# Patient Record
(demographics unavailable — no encounter records)

---

## 2025-01-29 NOTE — CONSULT LETTER
[Dear  ___] : Dear ~ISIS, [Consult Letter:] : I had the pleasure of evaluating your patient, [unfilled]. [Please see my note below.] : Please see my note below. [Consult Closing:] : Thank you very much for allowing me to participate in the care of this patient.  If you have any questions, please do not hesitate to contact me. [Sincerely,] : Sincerely, [FreeTextEntry2] : Dr. Marsha Fontana [FreeTextEntry3] : Devin Singletary M.D., FANILA. Associate Professor of Surgery [place] [employer] [facility] [employer] at [facility]

## 2025-01-29 NOTE — PHYSICAL EXAM
[Normal] : supple, no neck mass and thyroid not enlarged [Normal Neck Lymph Nodes] : normal neck lymph nodes  [Normal Supraclavicular Lymph Nodes] : normal supraclavicular lymph nodes [Normal Axillary Lymph Nodes] : normal axillary lymph nodes [Normal] : oriented to person, place and time, with appropriate affect [FreeTextEntry1] : SC present for exam  [de-identified] : Normal S1,S2. Regular rate and rhythm [de-identified] : Complete breast exam performed in supine and upright position. No palpable masses, tenderness, nipple discharge, inversion, deviation or enlarged axillary or supraclavicular lymph nodes bilaterally. [de-identified] : Clear breath sounds bilaterally with normal respiratory effort.

## 2025-01-29 NOTE — PHYSICAL EXAM
[Normal] : supple, no neck mass and thyroid not enlarged [Normal Neck Lymph Nodes] : normal neck lymph nodes  [Normal Supraclavicular Lymph Nodes] : normal supraclavicular lymph nodes [Normal Axillary Lymph Nodes] : normal axillary lymph nodes [Normal] : oriented to person, place and time, with appropriate affect [FreeTextEntry1] : SC present for exam  [de-identified] : Normal S1,S2. Regular rate and rhythm [de-identified] : Complete breast exam performed in supine and upright position. No palpable masses, tenderness, nipple discharge, inversion, deviation or enlarged axillary or supraclavicular lymph nodes bilaterally. [de-identified] : Clear breath sounds bilaterally with normal respiratory effort.

## 2025-01-29 NOTE — CONSULT LETTER
[Dear  ___] : Dear ~ISIS, [Consult Letter:] : I had the pleasure of evaluating your patient, [unfilled]. [Please see my note below.] : Please see my note below. [Consult Closing:] : Thank you very much for allowing me to participate in the care of this patient.  If you have any questions, please do not hesitate to contact me. [Sincerely,] : Sincerely, [FreeTextEntry2] : Dr. Marsha Fontana [FreeTextEntry3] : Devin Singletary M.D., FANILA. Associate Professor of Surgery [place] [employer] [facility] [employer] at [facility]

## 2025-01-29 NOTE — HISTORY OF PRESENT ILLNESS
[de-identified] : Ms. Jarrod Parikh is a 42 y.o F who presents for an initial consultation regarding BI-RADS 3 imaging. Referred by Dr. Marsha Fontana.  FH: Father with breast cancer  Genetics BRCA negative  No past medical history   B/L SM/US on 1/9/2025 shows a solid mass in the right breast at 10:00 with probably benign characteristics. 6-month R US recommended. B3

## 2025-01-29 NOTE — CONSULT LETTER
[Dear  ___] : Dear ~ISIS, [Consult Letter:] : I had the pleasure of evaluating your patient, [unfilled]. [Please see my note below.] : Please see my note below. [Consult Closing:] : Thank you very much for allowing me to participate in the care of this patient.  If you have any questions, please do not hesitate to contact me. [Sincerely,] : Sincerely, [FreeTextEntry2] : Dr. Marsha Fontana [FreeTextEntry3] : Devin Singletary M.D., FANILA. Associate Professor of Surgery [place] [facility] [employer] [facility] [employer] at [facility]

## 2025-01-29 NOTE — HISTORY OF PRESENT ILLNESS
[de-identified] : Ms. Jarrod Parikh is a 42 y.o F who presents for an initial consultation regarding BI-RADS 3 imaging. Referred by Dr. Marsha Fontana.  FH: Father with breast cancer  Genetics BRCA negative  No past medical history   B/L SM/US on 1/9/2025 shows a solid mass in the right breast at 10:00 with probably benign characteristics. 6-month R US recommended. B3

## 2025-01-29 NOTE — PHYSICAL EXAM
[Normal] : supple, no neck mass and thyroid not enlarged [Normal Neck Lymph Nodes] : normal neck lymph nodes  [Normal Supraclavicular Lymph Nodes] : normal supraclavicular lymph nodes [Normal Axillary Lymph Nodes] : normal axillary lymph nodes [Normal] : oriented to person, place and time, with appropriate affect [FreeTextEntry1] : SC present for exam  [de-identified] : Normal S1,S2. Regular rate and rhythm [de-identified] : Complete breast exam performed in supine and upright position. No palpable masses, tenderness, nipple discharge, inversion, deviation or enlarged axillary or supraclavicular lymph nodes bilaterally. [de-identified] : Clear breath sounds bilaterally with normal respiratory effort.

## 2025-01-29 NOTE — HISTORY OF PRESENT ILLNESS
[de-identified] : Ms. Jarrod Parikh is a 42 y.o F who presents for an initial consultation regarding BI-RADS 3 imaging. Referred by Dr. Marsha Fontana.  FH: Father with breast cancer  Genetics BRCA negative  No past medical history   B/L SM/US on 1/9/2025 shows a solid mass in the right breast at 10:00 with probably benign characteristics. 6-month R US recommended. B3

## 2025-01-29 NOTE — ASSESSMENT
[FreeTextEntry1] : IMP: Jarrod is a 42 y.o F with an abnormal breast US. In the right breast at 10:00 there is a solid mass with probably benign features, a 6 month US is recommended. Given patients family history in her father with breast cancer and a new solid mass seen on the imaging, patient is requesting to have it biopsied. This is not unreasonable and will discuss imaging with [facility] radiologists.   PLAN: Will review images with [facility] radiologist. Right USGB will be ordered after discussion with radiologist  All medical entries were at my, Dr. Devin Singletary, direction. I have reviewed the chart and agree that the record accurately reflects my personal performance of the history, physical exam, assessment and plan. Our office Nurse Practitioner was present of the duration of the office visit.